# Patient Record
Sex: FEMALE | URBAN - METROPOLITAN AREA
[De-identification: names, ages, dates, MRNs, and addresses within clinical notes are randomized per-mention and may not be internally consistent; named-entity substitution may affect disease eponyms.]

---
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
## 2025-02-16 NOTE — DISCUSSION/SUMMARY
[FreeTextEntry1] : Medication given\par Student to return to class in no distress Emphasized the importance of adequate kcal and protein intake; recommend to optimize nutritional intake in case of decreased appetite; recommended small frequent meals by ordering nutrient-dense snacks and leaving non-perishable food away from tray for later consumption during the day or between meals; to start with protein, and sips of supplement throughout the day; reviewed foods with protein and menu order procedures in hospital./(1) partially meets; needs review/practice/verbalization